# Patient Record
Sex: FEMALE | Race: WHITE | NOT HISPANIC OR LATINO | Employment: OTHER | URBAN - METROPOLITAN AREA
[De-identification: names, ages, dates, MRNs, and addresses within clinical notes are randomized per-mention and may not be internally consistent; named-entity substitution may affect disease eponyms.]

---

## 2024-08-21 ENCOUNTER — OFFICE VISIT (OUTPATIENT)
Dept: CARDIOLOGY | Facility: HOSPITAL | Age: 79
End: 2024-08-21
Payer: MEDICARE

## 2024-08-21 VITALS
DIASTOLIC BLOOD PRESSURE: 78 MMHG | HEART RATE: 79 BPM | OXYGEN SATURATION: 96 % | WEIGHT: 176.15 LBS | BODY MASS INDEX: 28.43 KG/M2 | SYSTOLIC BLOOD PRESSURE: 131 MMHG

## 2024-08-21 DIAGNOSIS — I10 BENIGN ESSENTIAL HYPERTENSION: Primary | ICD-10-CM

## 2024-08-21 LAB
ATRIAL RATE: 78 BPM
P AXIS: 62 DEGREES
P OFFSET: 174 MS
P ONSET: 121 MS
PR INTERVAL: 204 MS
Q ONSET: 223 MS
QRS COUNT: 13 BEATS
QRS DURATION: 84 MS
QT INTERVAL: 358 MS
QTC CALCULATION(BAZETT): 408 MS
QTC FREDERICIA: 390 MS
R AXIS: -25 DEGREES
T AXIS: 50 DEGREES
T OFFSET: 402 MS
VENTRICULAR RATE: 78 BPM

## 2024-08-21 PROCEDURE — 3075F SYST BP GE 130 - 139MM HG: CPT | Performed by: NURSE PRACTITIONER

## 2024-08-21 PROCEDURE — 99213 OFFICE O/P EST LOW 20 MIN: CPT | Performed by: NURSE PRACTITIONER

## 2024-08-21 PROCEDURE — 93005 ELECTROCARDIOGRAM TRACING: CPT | Performed by: NURSE PRACTITIONER

## 2024-08-21 PROCEDURE — 1159F MED LIST DOCD IN RCRD: CPT | Performed by: NURSE PRACTITIONER

## 2024-08-21 PROCEDURE — 3078F DIAST BP <80 MM HG: CPT | Performed by: NURSE PRACTITIONER

## 2024-08-21 PROCEDURE — G2211 COMPLEX E/M VISIT ADD ON: HCPCS | Performed by: NURSE PRACTITIONER

## 2024-08-21 PROCEDURE — 1036F TOBACCO NON-USER: CPT | Performed by: NURSE PRACTITIONER

## 2024-08-21 RX ORDER — AMLODIPINE BESYLATE 5 MG/1
1 TABLET ORAL
COMMUNITY
Start: 2024-06-20 | End: 2024-08-21 | Stop reason: SDUPTHER

## 2024-08-21 RX ORDER — AMLODIPINE BESYLATE 5 MG/1
5 TABLET ORAL
Qty: 90 TABLET | Refills: 3 | Status: SHIPPED | OUTPATIENT
Start: 2024-08-21 | End: 2025-08-21

## 2024-08-21 ASSESSMENT — ENCOUNTER SYMPTOMS
CONSTITUTIONAL NEGATIVE: 1
GASTROINTESTINAL NEGATIVE: 1
EYES NEGATIVE: 1
MYALGIAS: 1
CARDIOVASCULAR NEGATIVE: 1
PSYCHIATRIC NEGATIVE: 1
HEMATOLOGIC/LYMPHATIC NEGATIVE: 1
NEUROLOGICAL NEGATIVE: 1
ENDOCRINE NEGATIVE: 1
RESPIRATORY NEGATIVE: 1

## 2024-08-21 NOTE — PROGRESS NOTES
Referred by Dr. Moran for Follow-up (1 yr.)     History Of Present Illness:    Cha Valencia is a very pleasant 78 year old female with a history HTN, she is here for a follow up visit. The patient is seen in collaboration with Dr. Casey. Continues to feel well, stay active. Denies chest pain, shortness of breath or heart palpitations.     Review of Systems   Constitutional: Negative.   HENT: Negative.     Eyes: Negative.    Cardiovascular: Negative.    Respiratory: Negative.     Endocrine: Negative.    Hematologic/Lymphatic: Negative.    Skin: Negative.    Musculoskeletal:  Positive for myalgias.   Gastrointestinal: Negative.    Neurological: Negative.    Psychiatric/Behavioral: Negative.        Past Medical History:  She has no past medical history on file.    Past Surgical History:  She has a past surgical history that includes Other surgical history (02/07/2022).      Social History:  She reports that she has quit smoking. Her smoking use included cigarettes. She has never used smokeless tobacco. She reports that she does not drink alcohol and does not use drugs.    Family History:  No family history on file.     Allergies:  Aspirin and Sulfa (sulfonamide antibiotics)    Outpatient Medications:  Current Outpatient Medications   Medication Instructions    amLODIPine (Norvasc) 5 mg tablet 1 tablet, oral, Daily (0630)        Last Recorded Vitals:  Vitals:    08/21/24 0955   BP: 131/78   Pulse: 79   SpO2: 96%   Weight: 79.9 kg (176 lb 2.4 oz)       Physical Exam:  Physical Exam  Vitals reviewed.   HENT:      Head: Normocephalic.      Nose: Nose normal.   Eyes:      Pupils: Pupils are equal, round, and reactive to light.   Cardiovascular:      Rate and Rhythm: Normal rate and regular rhythm.   Pulmonary:      Effort: Pulmonary effort is normal.      Breath sounds: Normal breath sounds.   Abdominal:      General: Abdomen is flat.      Palpations: Abdomen is soft.   Musculoskeletal:         General: Normal range  "of motion.      Cervical back: Normal range of motion.   Skin:     General: Skin is warm and dry.   Neurological:      General: No focal deficit present.      Mental Status: He is alert and oriented to person, place, and time.   Psychiatric:         Mood and Affect: Mood normal.            Last Labs:  CBC -  Lab Results   Component Value Date    WBC 5.0 02/03/2022    HGB 15.6 02/03/2022    HCT 48.1 (H) 02/03/2022    MCV 90 02/03/2022     02/03/2022       CMP -  Lab Results   Component Value Date    CALCIUM 9.2 02/03/2022    PROT 7.3 02/03/2022    ALBUMIN 4.0 02/03/2022    AST 17 02/03/2022    ALT 15 02/03/2022    ALKPHOS 61 02/03/2022    BILITOT 0.7 02/03/2022       LIPID PANEL -   No results found for: \"CHOL\", \"TRIG\", \"HDL\", \"CHHDL\", \"LDLF\", \"VLDL\", \"NHDL\"    RENAL FUNCTION PANEL -   Lab Results   Component Value Date    GLUCOSE 104 (H) 02/03/2022     02/03/2022    K 3.7 02/03/2022     02/03/2022    CO2 27 02/03/2022    ANIONGAP 11 02/03/2022    BUN 8 02/03/2022    CREATININE 0.78 02/03/2022    CALCIUM 9.2 02/03/2022    ALBUMIN 4.0 02/03/2022        No results found for: \"BNP\", \"HGBA1C\"    Last Cardiology Tests:  ECG:  EKG independently reviewed from today showed sinus rhythm heart rate 78 bpm     Echo:    Ejection Fractions:    Cath:    Stress Test:    Cardiac Imaging:  CT HEART CALCIUM SCORING WO IV CONTRAST 03/14/2022  CT calcium score test 3/14/2022  LM 0,  LAD 0,  LCx 0,  RCA 0,     Total 0      Assessment/Plan   Mrs. Valencia is a very pleasant 78 year old female with a history of HTN, she continues to do well from a cardiac standpoint. Blood pressure at home has been well controlled. She continues to stay active. Weight loss was congratulated.      Plan   -call with any questions   -follow up in one year   -continue Amlodipine         Gabriela Walton, APRN-CNP  "

## 2025-08-20 ENCOUNTER — APPOINTMENT (OUTPATIENT)
Dept: CARDIOLOGY | Facility: HOSPITAL | Age: 80
End: 2025-08-20
Payer: MEDICARE

## 2025-08-27 ENCOUNTER — OFFICE VISIT (OUTPATIENT)
Dept: CARDIOLOGY | Facility: HOSPITAL | Age: 80
End: 2025-08-27
Payer: MEDICARE

## 2025-08-27 VITALS
DIASTOLIC BLOOD PRESSURE: 79 MMHG | SYSTOLIC BLOOD PRESSURE: 135 MMHG | BODY MASS INDEX: 27.65 KG/M2 | WEIGHT: 171.3 LBS | HEART RATE: 80 BPM | OXYGEN SATURATION: 94 %

## 2025-08-27 DIAGNOSIS — I10 BENIGN ESSENTIAL HYPERTENSION: Primary | ICD-10-CM

## 2025-08-27 PROCEDURE — 99212 OFFICE O/P EST SF 10 MIN: CPT

## 2025-08-27 PROCEDURE — 1159F MED LIST DOCD IN RCRD: CPT | Performed by: NURSE PRACTITIONER

## 2025-08-27 PROCEDURE — 3078F DIAST BP <80 MM HG: CPT | Performed by: NURSE PRACTITIONER

## 2025-08-27 PROCEDURE — 99214 OFFICE O/P EST MOD 30 MIN: CPT | Performed by: NURSE PRACTITIONER

## 2025-08-27 PROCEDURE — 1036F TOBACCO NON-USER: CPT | Performed by: NURSE PRACTITIONER

## 2025-08-27 PROCEDURE — 3075F SYST BP GE 130 - 139MM HG: CPT | Performed by: NURSE PRACTITIONER

## 2025-08-27 PROCEDURE — G2211 COMPLEX E/M VISIT ADD ON: HCPCS | Performed by: NURSE PRACTITIONER

## 2025-08-27 RX ORDER — AMLODIPINE BESYLATE 5 MG/1
5 TABLET ORAL
Qty: 90 TABLET | Refills: 3 | Status: SHIPPED | OUTPATIENT
Start: 2025-08-27 | End: 2026-08-27

## 2025-08-27 ASSESSMENT — ENCOUNTER SYMPTOMS
HEMATOLOGIC/LYMPHATIC NEGATIVE: 1
RESPIRATORY NEGATIVE: 1
ENDOCRINE NEGATIVE: 1
NEUROLOGICAL NEGATIVE: 1
PSYCHIATRIC NEGATIVE: 1
MYALGIAS: 1
EYES NEGATIVE: 1
GASTROINTESTINAL NEGATIVE: 1
CARDIOVASCULAR NEGATIVE: 1
CONSTITUTIONAL NEGATIVE: 1